# Patient Record
Sex: MALE | URBAN - METROPOLITAN AREA
[De-identification: names, ages, dates, MRNs, and addresses within clinical notes are randomized per-mention and may not be internally consistent; named-entity substitution may affect disease eponyms.]

---

## 2019-04-19 ENCOUNTER — COMMUNICATION - HEALTHEAST (OUTPATIENT)
Dept: FAMILY MEDICINE | Facility: CLINIC | Age: 24
End: 2019-04-19

## 2021-05-28 NOTE — TELEPHONE ENCOUNTER
The patient is listing Dr. Hyman as a primary care provider. Dr. Hyman retired from clinic practice December 2018. The patient is due to establish care with a new provider. The writer intends to contact the patient to assist with the scheduling process. If the patient has established care elsewhere, please discontinue Yenni as the primary physician and close.

## 2021-06-02 ENCOUNTER — RECORDS - HEALTHEAST (OUTPATIENT)
Dept: ADMINISTRATIVE | Facility: CLINIC | Age: 26
End: 2021-06-02

## 2021-06-19 NOTE — LETTER
Letter by Roman Andrade CMA at      Author: Roman Andrade CMA Service: -- Author Type: --    Filed:  Encounter Date: 4/19/2019 Status: (Other)         April 19, 2019     [place]  [address] [facility] [address]      Dear ,    Clinic staff was able to verify that you have not yet established care with a new healthcare provider.     As a reminder, Dr. Hyman has recently retired from the clinic.     Please contact the [facility], and a member of our clinical staff would be happy to assist you with scheduling an appointment to set up care with a new physician.     Please call [phone], and ask to schedule an establish care appointment with a new provider.     Thank you!